# Patient Record
Sex: MALE | Race: WHITE | Employment: FULL TIME | URBAN - METROPOLITAN AREA
[De-identification: names, ages, dates, MRNs, and addresses within clinical notes are randomized per-mention and may not be internally consistent; named-entity substitution may affect disease eponyms.]

---

## 2019-05-02 ENCOUNTER — HOSPITAL ENCOUNTER (EMERGENCY)
Age: 29
Discharge: HOME | End: 2019-05-02
Attending: EMERGENCY MEDICINE

## 2019-05-02 VITALS
BODY MASS INDEX: 41.47 KG/M2 | WEIGHT: 280 LBS | OXYGEN SATURATION: 100 % | HEART RATE: 67 BPM | SYSTOLIC BLOOD PRESSURE: 153 MMHG | TEMPERATURE: 97.4 F | RESPIRATION RATE: 18 BRPM | DIASTOLIC BLOOD PRESSURE: 85 MMHG | HEIGHT: 69 IN

## 2019-05-02 DIAGNOSIS — K08.89 PAIN, DENTAL: Primary | ICD-10-CM

## 2019-05-02 PROCEDURE — 99282 EMERGENCY DEPT VISIT SF MDM: CPT

## 2019-05-02 RX ORDER — PENICILLIN V POTASSIUM 500 MG/1
500 TABLET ORAL 4 TIMES DAILY
Qty: 40 TABLET | Refills: 0 | Status: SHIPPED | OUTPATIENT
Start: 2019-05-02 | End: 2019-05-12

## 2019-05-02 RX ORDER — TRAMADOL HYDROCHLORIDE 50 MG/1
50 TABLET ORAL EVERY 4 HOURS PRN
Qty: 15 TABLET | Refills: 0 | Status: SHIPPED | OUTPATIENT
Start: 2019-05-02 | End: 2019-05-05

## 2019-05-02 ASSESSMENT — PAIN SCALES - GENERAL: PAINLEVEL_OUTOF10: 8

## 2019-05-02 NOTE — LETTER
[address] [facility] [id number]  Phone: [phone]             May 2, 2019    Patient: Augusta Cisneros   YOB: 1990   Date of Visit: 5/2/2019       To Whom It May Concern:    Augusta Cisneros was seen and treated in our emergency department on 5/2/2019. He may return to work on 5/4/2019.       Sincerely,             Signature:__________________________________

## 2019-05-02 NOTE — ED PROVIDER NOTES
[address]  eMERGENCY dEPARTMENT eNCOUnter      Pt Name: Vernal Goodpasture  MRN: [id number]  Sumanthgfmiguel ángel 1990  Date of evaluation: 5/2/19      CHIEF COMPLAINT:   Chief Complaint   Patient presents with    Dental Pain     upper right side      HISTORY OF PRESENT ILLNESS    Vernal Goodpasture is a 34 y.o. male who presents with complaints of right upper dental pain that began this morning. Pt admits to a broken tooth. Denies injury. Pain is 8/10, constant. Pt denies trouble breathing/ swallowing, n/v/f/c/abd pain/CP/SOB. Pt does have a dentist.  Is calling for apt tomorrow. REVIEW OF SYSTEMS     Constitutional: Denies recent fever, chills. Eyes: No visual changes. Neck: No neck pain. Respiratory: Denies recent shortness of breath. Cardiac:  Denies recent chest pain. GI: denies any recent abdominal pain nausea or vomiting. Denies Blood in the stool or black tarry stools. : denies dysuria. Musculoskeletal: Denies focal weakness. Neurologic: denies headache or focal weakness. Skin:  Denies any rash. Negative in 10 essential Systems except as mentioned above and in the HPI. PAST MEDICAL HISTORY   PMH:  has no past medical history on file. none otherwise stated in nurses notes  Surgical History:  has a past surgical history that includes Tonsillectomy. none otherwise stated in nurses notes  Social History:  reports that he has never smoked. He does not have any smokeless tobacco history on file. He reports that he does not drink alcohol or use drugs. none otherwise stated in nurses notes  Family History: none otherwise stated in nurses notes  Psychiatric History: none otherwise stated in nurses notes    Allergies:has No Known Allergies.       PHYSICAL EXAM     INITIAL VITALS: BP (!) 153/85   Pulse 67   Temp 97.4 °F (36.3 °C) (Oral)   Resp 18   Ht 5' 9\" (1.753 m)   Wt 280 lb (127 kg)   SpO2 100%   BMI 41.35 kg/m²   CONSTITUTIONAL: Vital signs reviewed, Alert and oriented X 3. HEAD: Atraumatic, Normocephalic. EYES: Eyes are normal to inspection, Pupils equal, round and reactive to light. NECK: Normal ROM, No jugular venous distention, No meningeal signs, Cervical spine nontender. MOUTH:  + dental pain at 5 - broken tooth, with no signs of abscess formation or Rafa sign noted. No swelling involving the airway at all. No trismus. Lips are normal.  No tongue elevation. No tenderness over floor of mouth. Controlling secretions. Speaking in full sentences. RESPIRATORY CHEST: No respiratory distress. ABDOMEN: Abdomen is nontender, No distension. UPPER EXTREMITY: Inspection normal, No cyanosis. NEURO: GCS is 15, Speech normal, Memory normal.   SKIN: Skin is warm, Skin is dry. PSYCHIATRIC: Oriented X 3, Normal affect. EMERGENCY DEPARTMENT COURSE:   Pain meds and antibiotic prescriptions. Pt provided with dental clinic list and instructed to call as soon as possible for an appointment. Instructed to return for worsening or any new symptoms including throat swelling, difficulty swallowing or breathing. Pt agrees. Discussed results and plan with the pt. They expressed appropriate understanding. Pt given close follow up, supportive care instructions and strict return instructions at the bedside. FINAL IMPRESSION:     1. Pain, dental          DISPOSITION:  DISPOSITION Decision To Discharge 05/02/2019 06:21:37 PM        PATIENT REFERRED TO:  [facility] ED  [address] [address]  [phone]    If symptoms worsen    pcp  see clinic list          DISCHARGE MEDICATIONS:  New Prescriptions    PENICILLIN V POTASSIUM (VEETID) 500 MG TABLET    Take 1 tablet by mouth 4 times daily for 10 days    TRAMADOL (ULTRAM) 50 MG TABLET    Take 1 tablet by mouth every 4 hours as needed for Pain for up to 3 days. Intended supply: 3 days.  Take lowest dose possible to manage pain       (Please note that portions of this note were completed

## 2025-06-07 ENCOUNTER — HOSPITAL ENCOUNTER (EMERGENCY)
Age: 35
Discharge: HOME | End: 2025-06-07
Attending: EMERGENCY MEDICINE

## 2025-06-07 VITALS
DIASTOLIC BLOOD PRESSURE: 129 MMHG | BODY MASS INDEX: 37.77 KG/M2 | RESPIRATION RATE: 16 BRPM | WEIGHT: 255 LBS | HEIGHT: 69 IN | SYSTOLIC BLOOD PRESSURE: 182 MMHG | OXYGEN SATURATION: 99 % | HEART RATE: 77 BPM

## 2025-06-07 DIAGNOSIS — K04.7 DENTAL INFECTION: ICD-10-CM

## 2025-06-07 DIAGNOSIS — K08.89 TOOTHACHE: Primary | ICD-10-CM

## 2025-06-07 PROCEDURE — 6370000000 HC RX 637 (ALT 250 FOR IP): Performed by: EMERGENCY MEDICINE

## 2025-06-07 PROCEDURE — 99283 EMERGENCY DEPT VISIT LOW MDM: CPT

## 2025-06-07 RX ORDER — PENICILLIN V POTASSIUM 250 MG/1
500 TABLET, FILM COATED ORAL ONCE
Status: COMPLETED | OUTPATIENT
Start: 2025-06-07 | End: 2025-06-07

## 2025-06-07 RX ORDER — HYDROCODONE BITARTRATE AND ACETAMINOPHEN 5; 325 MG/1; MG/1
1 TABLET ORAL ONCE
Refills: 0 | Status: COMPLETED | OUTPATIENT
Start: 2025-06-07 | End: 2025-06-07

## 2025-06-07 RX ORDER — KETOROLAC TROMETHAMINE 30 MG/ML
30 INJECTION, SOLUTION INTRAMUSCULAR; INTRAVENOUS ONCE
Status: DISCONTINUED | OUTPATIENT
Start: 2025-06-07 | End: 2025-06-07 | Stop reason: HOSPADM

## 2025-06-07 RX ORDER — HYDROCODONE BITARTRATE AND ACETAMINOPHEN 5; 325 MG/1; MG/1
1 TABLET ORAL EVERY 6 HOURS PRN
Qty: 12 TABLET | Refills: 0 | Status: SHIPPED | OUTPATIENT
Start: 2025-06-07 | End: 2025-06-10

## 2025-06-07 RX ORDER — PENICILLIN V POTASSIUM 500 MG/1
500 TABLET, FILM COATED ORAL 4 TIMES DAILY
Qty: 40 TABLET | Refills: 0 | Status: SHIPPED | OUTPATIENT
Start: 2025-06-07 | End: 2025-06-17

## 2025-06-07 RX ADMIN — PENICILLIN V POTASSIUM 500 MG: 250 TABLET, FILM COATED ORAL at 08:04

## 2025-06-07 RX ADMIN — HYDROCODONE BITARTRATE AND ACETAMINOPHEN 1 TABLET: 5; 325 TABLET ORAL at 08:04

## 2025-06-07 ASSESSMENT — PAIN - FUNCTIONAL ASSESSMENT: PAIN_FUNCTIONAL_ASSESSMENT: 0-10

## 2025-06-07 ASSESSMENT — ENCOUNTER SYMPTOMS
ABDOMINAL PAIN: 0
SHORTNESS OF BREATH: 0
EYE PAIN: 0
BACK PAIN: 0
COLOR CHANGE: 0

## 2025-06-07 ASSESSMENT — PAIN SCALES - GENERAL
PAINLEVEL_OUTOF10: 9
PAINLEVEL_OUTOF10: 9

## 2025-06-07 NOTE — ED PROVIDER NOTES
EMERGENCY DEPARTMENT ENCOUNTER    Pt Name: Callum Lennon  MRN: [id number]  Birthdate 1990  Date of evaluation: 6/7/25  CHIEF COMPLAINT       Chief Complaint   Patient presents with    Dental Pain     HISTORY OF PRESENT ILLNESS   34-year-old male presents for dental pain.  Patient has reported known fracture of the left upper tooth, he states in the last approximately 2 days has been hurting him.  He states that he also noted a little bit of swelling to his face today presented for evaluation.  He states that he cannot get into the dentist till next week.  Denies any associated fevers denies any difficulty swallowing or difficulty breathing.  He reports he has been taking over-the-counter pain meds with minimal relief of pain, states that drinking water does help, history of prior dental issues    The history is provided by the patient.           REVIEW OF SYSTEMS     Review of Systems   Constitutional:  Negative for chills and fever.   HENT:  Positive for dental problem. Negative for congestion and ear pain.    Eyes:  Negative for pain.   Respiratory:  Negative for shortness of breath.    Cardiovascular:  Negative for chest pain, palpitations and leg swelling.   Gastrointestinal:  Negative for abdominal pain.   Genitourinary:  Negative for dysuria and flank pain.   Musculoskeletal:  Negative for back pain.   Skin:  Negative for color change.   Neurological:  Negative for numbness and headaches.   Psychiatric/Behavioral:  Negative for confusion.    All other systems reviewed and are negative.    PASTMEDICAL HISTORY     Past Medical History:   Diagnosis Date    Dental decay      Past Problem List  There is no problem list on file for this patient.    SURGICAL HISTORY       Past Surgical History:   Procedure Laterality Date    TONSILLECTOMY       CURRENT MEDICATIONS       Previous Medications    ACETAMINOPHEN-CODEINE (TYLENOL/CODEINE #3) 300-30 MG PER TABLET    Take 1 tablet by mouth every 4 hours as needed for Pain